# Patient Record
Sex: MALE | Race: WHITE | NOT HISPANIC OR LATINO | URBAN - METROPOLITAN AREA
[De-identification: names, ages, dates, MRNs, and addresses within clinical notes are randomized per-mention and may not be internally consistent; named-entity substitution may affect disease eponyms.]

---

## 2021-04-11 ENCOUNTER — EMERGENCY (EMERGENCY)
Facility: HOSPITAL | Age: 21
LOS: 0 days | Discharge: LEFT BEFORE DISCHARGE | End: 2021-04-11
Attending: EMERGENCY MEDICINE
Payer: COMMERCIAL

## 2021-04-11 VITALS
TEMPERATURE: 99 F | OXYGEN SATURATION: 98 % | DIASTOLIC BLOOD PRESSURE: 64 MMHG | WEIGHT: 160.06 LBS | RESPIRATION RATE: 22 BRPM | HEIGHT: 69 IN | SYSTOLIC BLOOD PRESSURE: 124 MMHG | HEART RATE: 72 BPM

## 2021-04-11 DIAGNOSIS — Z20.822 CONTACT WITH AND (SUSPECTED) EXPOSURE TO COVID-19: ICD-10-CM

## 2021-04-11 LAB
FLUAV AG NPH QL: SIGNIFICANT CHANGE UP
FLUBV AG NPH QL: SIGNIFICANT CHANGE UP
SARS-COV-2 RNA SPEC QL NAA+PROBE: SIGNIFICANT CHANGE UP

## 2021-04-11 PROCEDURE — 99284 EMERGENCY DEPT VISIT MOD MDM: CPT

## 2021-04-11 RX ORDER — SODIUM CHLORIDE 9 MG/ML
1000 INJECTION INTRAMUSCULAR; INTRAVENOUS; SUBCUTANEOUS ONCE
Refills: 0 | Status: DISCONTINUED | OUTPATIENT
Start: 2021-04-11 | End: 2021-04-11

## 2021-04-11 RX ORDER — ACETAMINOPHEN 500 MG
975 TABLET ORAL ONCE
Refills: 0 | Status: COMPLETED | OUTPATIENT
Start: 2021-04-11 | End: 2021-04-11

## 2021-04-11 NOTE — ED PROVIDER NOTE - CONSTITUTIONAL, MLM
normal... Well appearing, awake, alert, oriented to person, place, time/situation and in no apparent distress. Speaking in clear full sentences no nasal flaring no shoulders retractions no diaphoresis, no active coughing, not holding his head/chest/abdomen, smiling appears very comrfortable

## 2021-04-11 NOTE — ED ADULT NURSE NOTE - OBJECTIVE STATEMENT
pt c/o cough x2 days after being exposed to covid + person. States SOB w/ headache and is here to get covid swabbed. Denies loss of taste or smell, fever, n/v, and chest pain. O2 sat high 90's on tele. Pt refusing all bloodwood and medications at this time. MD aware.

## 2021-04-11 NOTE — ED ADULT TRIAGE NOTE - CHIEF COMPLAINT QUOTE
21 y/o male with NO PMH. Presents to the Ed with c/c of headache, sob and cough that came on yesterday after being exposed to + coivd person. pt denies any n/v/d/c or chest pain.

## 2021-04-11 NOTE — ED PROVIDER NOTE - OBJECTIVE STATEMENT
20 years old male walked in requests only covid test refuses chest x ray labs and iv fluid. Pt sts he is having cough, slight sob, and slight headache since last night. Pt sts he was exposed to a person with covid last week.

## 2021-04-11 NOTE — ED ADULT NURSE REASSESSMENT NOTE - NS ED NURSE REASSESS COMMENT FT1
Pt refusing all blood work and medication at this time. States he only want a covid test. Covid test done as per order. Patient requesting discharge. No distress noted. VS WDL. MD Gregory made aware at this time. Awaiting orders. Will monitor.

## 2024-08-29 PROBLEM — Z00.00 ENCOUNTER FOR PREVENTIVE HEALTH EXAMINATION: Status: ACTIVE | Noted: 2024-08-29

## 2024-09-03 ENCOUNTER — APPOINTMENT (OUTPATIENT)
Dept: ORTHOPEDIC SURGERY | Facility: CLINIC | Age: 24
End: 2024-09-03
Payer: COMMERCIAL

## 2024-09-03 VITALS — WEIGHT: 158 LBS | HEIGHT: 69 IN | BODY MASS INDEX: 23.4 KG/M2

## 2024-09-03 DIAGNOSIS — S83.412A SPRAIN OF MEDIAL COLLATERAL LIGAMENT OF LEFT KNEE, INITIAL ENCOUNTER: ICD-10-CM

## 2024-09-03 DIAGNOSIS — Z78.9 OTHER SPECIFIED HEALTH STATUS: ICD-10-CM

## 2024-09-03 PROCEDURE — 99203 OFFICE O/P NEW LOW 30 MIN: CPT

## 2024-09-03 PROCEDURE — 73562 X-RAY EXAM OF KNEE 3: CPT | Mod: LT

## 2024-09-03 NOTE — HISTORY OF PRESENT ILLNESS
[Sudden] : sudden [5] : 5 [0] : 0 [Occasional] : occasional [de-identified] : This is Mr. JOSE OAKES  a 24 year old male who comes in today complaining of left knee pain for one month with no injury.  Was sitting on a  and moved to shift his position and felt a sharp pain in the left knee. Pain is now intermittent and occasionally feels it while playing basketball. [] : no

## 2024-09-03 NOTE — IMAGING
[Left] : left knee [All Views] : anteroposterior, lateral, skyline, and anteroposterior standing [There are no fractures, subluxations or dislocations. No significant abnormalities are seen] : There are no fractures, subluxations or dislocations. No significant abnormalities are seen [de-identified] :   ----------------------------------------------------------------------------   Left knee exam:   Skin: no significant pertinent finding  Inspection:     (neg) Effusion     (neg) Malalignment     (neg) Swelling     (neg) Quad atrophy     (neg) J-sign  ROM:     0 - 135 degrees of flexion.  Tenderness:     (neg) MJLT     (neg) LJLT     (neg) Medial patellar facet tenderness     (neg) Lateral patellar facet tenderness     (+mild) Crepitus     (neg) Patellar grind tenderness     (neg) Patellar tendon     (neg) Quad tendon     Other:  Stability:     (neg) Lachman     (neg) Varus/Valgus instability     (neg) Posterior drawer     (neg) Patellar translation: wnl  Additional tests:     (neg) McMurrays test     (neg) Patellar apprehension     Other:  Strength: 5/5 Q/H/TA/GS/EHL  Neuro: In tact to light touch throughout, DTR's wnl  Vascularity: Extremity warm and well perfused  Gait: normal

## 2024-09-03 NOTE — DISCUSSION/SUMMARY
[de-identified] : Discussed and recommend rest nsaids, ice prn Activity mod, activity as tolerated Will consider MRI if no improvement in 4 weeks with conservative treatment  ----------------------------------------------------------------------------  Patient warned of specific risks of medication related to bleeding, GI issues, increase blood pressure, and cardiac risks in addition to additional risks.  Patient advised to discuss with PMD  if any presence of stated issues.  ----------------------------------------------------------------------------   All relevant imaging studies pertinent to today's visit, including x-rays, MRI's and/or other advanced imaging studies (CT/etc) were independently interpreted and reviewed with the patient as needed. Implications of the studies together with the patient's clinical picture were discussed to formulate a working diagnosis and management options were detailed.   The patient and/or guardian was advised of the diagnosis.  The natural history of the pathology was explained in full. All questions were answered.  The risks and benefits of conservative and interventional treatment alternatives were explained to the patient  The patient and/or guardian was advised if any advanced diagnostic/imaging study (MRI/CT/etc) is ordered to evaluate potential pathology in the affected area(s), they should follow up in the office to review the results of the study and determine further management that may be indicated.